# Patient Record
(demographics unavailable — no encounter records)

---

## 2025-01-07 NOTE — PHYSICAL EXAM
[FreeTextEntry3] : AAOx3, pleasant, NAD, no visual lymphadenopathy hair, scalp, face, nose, eyelids, ears, lips, oropharynx, neck, chest, abdomen, back, right arm, left arm, nails, and hands examined with all normal findings, pertinent findings include:  multiple benign nevi and lentigines + inflamed, waxy, keratotic papule on mid back

## 2025-01-07 NOTE — ASSESSMENT
[FreeTextEntry1] : 1) skin check- -benign findings as above  2) ISK The specified lesions were treated with liquid nitrogen cryotherapy.  Discussed risks including pain, blistering, crusting, discoloration, recurrence.

## 2025-07-02 NOTE — HISTORY OF PRESENT ILLNESS
[FreeTextEntry1] :  The patient comes in for a yearly wellness evaluation.  [de-identified] : The patient is feeling well at this time. The patient is in good medical health, continuing to be maintained without any medications. He exercises regularly doing cardio, and reports that he is also physically active at work. There has been no cough, wheeze, chest pain, shortness of breath, palpitations, or PND.   His appetite is good, and his weight is stable. He denies ay bowel or urinary issues.  There have been no cough, fevers, chills, or night sweats.  The patient does consume alcohol on a regular basis. He does wear a seatbelt when in a motor vehicle. There is no evidence of depression or suicidal ideations. The patient reports a very social life and denies any loneliness or isolation. He denies any difficulty carrying out daily activities such as dressing and grooming. He also denies any difficulty ambulating on a daily basis. Additionally, he denies any issues performing instrumental daily activities such as using the phone, doing laundry, housekeeping, driving, managing medications, shopping, or handling finances.  Last CPE: 6/25/24  Colonoscopy: 2022, due next year  Ophthalmology: n/a  Dermatology: up to date  Dentist: n/a  Flu: due in fall  Tdap: up to date  COVID: due in fall  Diet: regular  Exercise: exercises routinely

## 2025-07-02 NOTE — DATA REVIEWED
[FreeTextEntry1] : EKG reveals sinus bradycardia at a rate of 56.  There is low voltage.  There are normal intervals and axis.  An RSR prime is noted in V2.  There are no acute ST-T wave changes seen and no significant changes when compared to the prior EKG.

## 2025-07-02 NOTE — PLAN
[FreeTextEntry1] : 1. Continue monitoring without medications at this time.   2. The patient will undergo routine fasting bloodwork in the near future, including a Lyme titer.    3. Follow up in 1 year with wellness and routine fasting bloodwork, including a Lyme titer.    4. Follow with dermatology for annual skin evaluation in 2026.    5. Maintain exercise regimen as tolerated.   6. Follow up with GI for follow up surveillance colonoscopy.  He is due in the spring 2026.  7. The patient received the Prevnar 21 vaccine in office today, 7/2/25.

## 2025-07-02 NOTE — HEALTH RISK ASSESSMENT
[2 - 4 times a month (2 pts)] : 2-4 times a month (2 points) [3 or 4 (1 pt)] : 3 or 4  (1 point) [Never (0 pts)] : Never (0 points) [No] : In the past 12 months have you used drugs other than those required for medical reasons? No [No falls in past year] : Patient reported no falls in the past year [0] : 2) Feeling down, depressed, or hopeless: Not at all (0) [PHQ-2 Negative - No further assessment needed] : PHQ-2 Negative - No further assessment needed [Never] : Never [YES] : Yes [Have you attended a firearm safety workshop or class?] : A firearm safety workshop or class has been attended. [Patient reported colonoscopy was normal] : Patient reported colonoscopy was normal [Are there any unlocked firearms stored in your household?] : No unlocked firearms in the household. [Are there any firearms stored in your household that are loaded?] : No firearms are stored in the household loaded. [Are there any children in your household?] : No children are in the household. [Has anyone in the household been feeling low/depressed/been struggling?] : No one in the household has been feeling low/depressed/been struggling. [BoneDensityComments] : No [ColonoscopyDate] : 2022

## 2025-07-02 NOTE — PHYSICAL EXAM
[Well Nourished] : well nourished [Well Developed] : well developed [Well-Appearing] : well-appearing [Normal Sclera/Conjunctiva] : normal sclera/conjunctiva [EOMI] : extraocular movements intact [Normal Outer Ear/Nose] : the outer ears and nose were normal in appearance [No JVD] : no jugular venous distention [Supple] : supple [Thyroid Normal, No Nodules] : the thyroid was normal and there were no nodules present [No Respiratory Distress] : no respiratory distress  [No Accessory Muscle Use] : no accessory muscle use [Clear to Auscultation] : lungs were clear to auscultation bilaterally [Normal Rate] : normal rate  [Regular Rhythm] : with a regular rhythm [Normal S1, S2] : normal S1 and S2 [No Murmur] : no murmur heard [No Carotid Bruits] : no carotid bruits [Pedal Pulses Present] : the pedal pulses are present [No Edema] : there was no peripheral edema [No Extremity Clubbing/Cyanosis] : no extremity clubbing/cyanosis [Soft] : abdomen soft [Non Tender] : non-tender [Non-distended] : non-distended [No Masses] : no abdominal mass palpated [No HSM] : no HSM [Normal Bowel Sounds] : normal bowel sounds [Normal Sphincter Tone] : normal sphincter tone [Prostate Enlargement] : the prostate was not enlarged [Prostate Tenderness] : the prostate was not tender [No Prostate Nodules] : no prostate nodules [No CVA Tenderness] : no CVA  tenderness [No Spinal Tenderness] : no spinal tenderness [No Joint Swelling] : no joint swelling [No Rash] : no rash [Coordination Grossly Intact] : coordination grossly intact [Normal Gait] : normal gait [Normal Affect] : the affect was normal [Normal Insight/Judgement] : insight and judgment were intact [Stool Occult Blood] : stool negative for occult blood [Normal Supraclavicular Nodes] : no supraclavicular lymphadenopathy [Normal Posterior Cervical Nodes] : no posterior cervical lymphadenopathy [Normal Anterior Cervical Nodes] : no anterior cervical lymphadenopathy [de-identified] : There is a slightly prominent tonsillar tissue, and slight veiling of the posterior pharynx

## 2025-07-02 NOTE — ADDENDUM
[FreeTextEntry1] : This note was written by Myrna Oropeza on 07/02/2025 acting as a medical scribe for Dr. Girma Calle MD. All medical entries made by the scribe were at my, Dr. Girma Calle's, direction and personally dictated by me on 07/02/2025. I have reviewed the chart and agree that the record accurately reflects my personal performance of the history, review of systems, assessment, and plan. I have also personally directed, reviewed, and agreed with the chart.